# Patient Record
Sex: FEMALE | Race: BLACK OR AFRICAN AMERICAN
[De-identification: names, ages, dates, MRNs, and addresses within clinical notes are randomized per-mention and may not be internally consistent; named-entity substitution may affect disease eponyms.]

---

## 2018-02-22 NOTE — RAD
AP PELVIS:

 

Indication: Fall with pelvic pain. 

 

Comparison: None. 

 

FINDINGS: 

No displaced pelvic fracture is evident. Visualized aspects of the proximal femurs are within normal 
limits. The right proximal femur is incompletely included within the field of view. 

 

IMPRESSION: 

No acute osseous abnormality. 

 

POS: [facility]

## 2018-02-22 NOTE — HP
DATE OF ADMISSION:  02/22/2018

 

REQUESTING PHYSICIAN:  Dr. Rizzo, ER.

 

ADMITTING PHYSICIAN:  Dr. Rodríguez, Trauma.

 

CONSULTING PHYSICIAN:  Dr. Salazar, Orthopedics.

 

HISTORY OF PRESENT ILLNESS:  The patient is a 47-year-old female who was in her usual state of health
 this morning when she reports she was ambulating in her house when her feet got tangled up in some c
ords going to an electric blanket.  She then had a fall there on her living room floor.  She immediat
ely felt pain in her right lower leg.  She was transported to the [facility] via
 EMS.  Workup in the emergency department identified a right open midshaft tib/fib fracture.  She had
 no other identified injuries.  She complained of no other pain.  She reports the pain is constant.  
She does not have any report of loss of sensation in her right lower extremity.  Trauma Services was 
consulted for admission and management.  Dr. Salazar, Orthopedics has been consulted for management
 of fracture.  At the time of evaluation in the ED, she was also being evaluated by PA from Orthopedi
c Surgery.  She reports the pain is constant and exacerbated by movement.  The pain is relieved sligh
tly with administration of IV narcotic analgesia.

 

PAST MEDICAL HISTORY:

1.  Hypertension.

2.  Lupus.

3.  Raynaud's syndrome.

4.  Osteoarthritis.

5.  TIAs in 2016.

6.  Peripheral neuropathy.

 

PAST SURGICAL HISTORY:

1.  Back surgery in 1999.

2.  Left foot bunion surgery.

3.  Bilateral tubal ligation.

 

SOCIAL HISTORY:  The patient denies alcohol, drug or tobacco use.

 

ALLERGIES:  No known drug allergies.

 

CURRENT MEDICATIONS:  Patient reported medications are:

1.  Amlodipine 10 mg daily.

2.  Prednisone 20 mg daily.

3.  Chloroquine, unknown dosage.

4.  Valium, unknown dosage.

5.  Tramadol, unknown dosage.

6.  Gabapentin, unknown dosage.

 

LABORATORY STUDIES:  Hematology:  WBC 6.8, RBC 4.61, hemoglobin 13.7, hematocrit 44.1, platelets 198,
000.  Chemistry:  Sodium 137, potassium 4.7, chloride 102, carbon dioxide 28, BUN 14, creatinine 0.83
, glucose 117.

 

DIAGNOSTIC IMAGING:  Right tibia and fibula x-ray, open moderately displaced tib/fib fracture.

 

EKG interpretation, normal sinus rhythm.

 

REVIEW OF SYSTEMS:  Constitutional:  Negative for fever, chills, weight loss, general malaise.  HEENT
:  No complaints.  Cardiovascular:  The patient reports Raynaud's syndrome.  No chest pain, no palpit
ations, no syncope.  Respiratory:  Denies cough, shortness of breath.  Gastrointestinal:  Denies abdo
berenice pain, constipation, nausea, vomiting or diarrhea.  Musculoskeletal:  Reports fall with right le
g injury.  Reports open right leg fracture.  Skin:  Denies rashes or injury.  Neurologic:  Denies diz
ziness, focal weakness.  Rheumatology:  Reports treatment for systemic lupus.  Hemo/lymphatic:  Negat
brenna.  Psychiatric:  Denies anxiety or depression.

 

PHYSICAL EXAMINATION:

VITAL SIGNS:  Blood pressure 145/96, pulse 85, respirations 18, pain 8/10, O2 sat 96% on room air.

CONSTITUTIONAL:  Well-developed, well-nourished female, lying on bed, in no acute distress.

HEENT:  Atraumatic, normocephalic.  Trachea midline.  No pain to posterior neck.

RESPIRATORY:  Bilateral breath sounds clear to auscultation.  No respiratory distress.

CARDIOVASCULAR:  Regular rate and rhythm.  Heart sounds normal.  EKG with normal sinus rhythm.

ABDOMEN:  Soft, nontender, nondistended.  No masses.  Bowel sounds normal.

EXTREMITIES:  Bilateral upper extremities within normal limits.  Left lower extremity within normal l
imits.  Cap refill brisk.  Pulses 2+.  Right lower extremity, mid shaft open fracture.  2+ pedal puls
es.  Cap refill brisk.  No loss of sensation.

NEUROLOGIC:  GCS 15.  Awake, alert, oriented x3.

PSYCHIATRIC:  Normal mood and affect.

 

ASSESSMENT AND PLAN:

1.  Status post ground level fall.

2.  Right mid shaft open tib/fib fracture.

3.  Acute traumatic pain.

4.  History of lupus.

5.  History of hypertension.

6.  History of Raynaud's syndrome.

 

PLAN:

1.  Admit to surgical floor by Trauma Services.

2.  Consult Orthopedics.  Discussed with Orthopedics PA.  Plan is to take patient to OR later today.

3.  N.p.o.  IV fluids.

4.  IV analgesia for pain control.

5.  PT, OT consult after OR with Orthopedic limitation.

6.  Rehab referral will be made.

7.  Chemical DVT prophylaxis when okay with Orthopedics.

8.  PUD prophylaxis.

9.  Nursing to confirm with outside pharmacy correct current medications.

 

The patient was seen and examined with Dr. Rodríguez, attending trauma surgeon, who agrees with the asses
sment and plan.

## 2018-02-22 NOTE — RAD
FRONTAL AND LATERAL INTRAOPERATIVE IMAGING OF RIGHT TIBIA AND FIBULA

2/22/18

 

HISTORY: 

Fracture status post ORIF.

 

FINDINGS:  

Six intraoperative images are provided. Images demonstrate a nondisplaced fracture involving the mid 
shaft right fibula. A comminuted tibial fracture is present in the mid shaft/distal shaft region, tra
versed by an intramedullary lester with distal and proximal interlocking screws. 

 

IMPRESSION:  

Fractures of the right tibia and fibula status post ORIF as above. 

 

POS: [facility]

## 2018-02-22 NOTE — CON
DATE OF CONSULTATION:  02/22/2018

 

CONSULTING PHYSICIAN:  Dr. Cruzito Salazar

 

We were asked by the ER and Trauma to see the patient.  

 

HISTORY: She was in her normal state of health this morning when she was walking through her house, t
ripped and sustained an open midshaft right tib-fib fracture.  The patient is in significant pain.  S
he is quite anxious.  She recalls the incident, did not hit her head or lose any consciousness.  Curr
ently, she has gotten 2 grams of Ancef at the ER.  We are currently, about ready to splint her.  She 
has good sensations in that right lower extremity.  She does have multiple health issues.  I spoke wi
th Trauma regarding surgical intervention and they are okay with us going this afternoon.  She did ea
t at 7:00 a.m. this morning and she will be our third case this afternoon.

 

PAST MEDICAL HISTORY:  Positive for hypertension, some neurological disease.  She has had a CVA and l
upus, Raynaud's, osteoarthritis, neuropathy, knee pain.

 

PAST SURGICAL HISTORY:  Tubal ligation, back surgery, bilateral bunions and clavicle surgery.

 

PSYCHIATRIC HISTORY:  Depression.

 

FAMILY HISTORY:  Noncontributory.

 

ALLERGIES:  None.

 

CURRENT MEDICATIONS:  She takes Plaquenil, gabapentin, Valium, tramadol, prednisone, amlodipine, aspi
rin.  She is unsure if this is all her medications.  We are trying to track down her medication list 
from her pharmacy.  Trauma is working on that currently.

 

REVIEW OF SYSTEMS:  The patient denies any shortness of breath, chest pain.  No bowel or bladder issu
es.  Her only complaint currently is the significant pain in the right lower extremity.

 

PHYSICAL EXAMINATION:

GENERAL:  Well-nourished, well-developed, mildly obese female resting in bed in mild distress.  Speec
h clear.  Answers questions appropriately.

HEENT:  Normal exam.

NECK:  Supple, trachea midline.

EXTREMITIES:  Upper extremities, full size, shape, symmetry, normal bulk and tone.  Movements are equ
al as are strengths.  Left lower extremity without any abnormal findings.  She does have a noted scar
 over her great toe region from bunion as she does on the left side also.  Right lower extremity obvi
ous opening to mid shin, it has been reduced by EMT transports.  She has good sensations.  DP, PT pul
ses are intact.  She is moving bilateral feet well.

 

ASSESSMENT:  

1.  Multiple health issues.

2.  Open right tibia fracture.

 

PLAN:  I spoke with patient.  We plan on doing the tibial nail today.  She has already gotten 2 grams
 of Ancef.  I went over the risks and benefits of surgery.  The patient was very panicked about talki
ng about cardiac, blood clots, respiratory issues, but she understands the need to go forth with surg
opal and she has given verbal authorization.  We will again go over the risks and benefits of surgery 
prior to surgery, so she can feel a little more comfortable and have anesthesia talked to her.  The l
eg has been splinted by the emergency room.  We will get her set up for surgery and get her posted in
 the OR.  She has currently eaten at 7, but we will keep her n.p.o. and antibiotics on hold to the OR
.

## 2018-02-22 NOTE — RAD
TWO VIEWS OF THE RIGHT FORELEG:

 

Indication: Tripped and fell. 

 

Comparison: None. 

 

FINDINGS: 

There is a spiral fracture involving the mid shaft of the right tibia with posterior displacement of 
the distal fracture fragment one-half shaft width. There is also right patellar rotation of the dista
l fracture fragment in relationship to the proximal tibia approximately 90 degrees. There is also chantelle
e slight medial angulation of the distal fracture fragment at the fracture site. There is associated 
mildly displaced transverse fracture involving the midshaft of the fibula with displacement of the di
stal fracture fragment laterally and posteriorly one-half shaft width. There is soft tissue prominenc
e seen surrounding the fracture site. There is suggestion of some soft tissue gas along the anterior 
and lateral aspect of the right mid foreleg suspicious for an open injury. No additional acute fractu
re is evident. 

 

IMPRESSION: 

Findings suspicious for open, mild to moderately displaced foreleg fracture. 

 

POS: [facility]

## 2018-02-22 NOTE — CT
CT BRAIN WITHOUT CONTRAST:

 

Date:  02/22/18 

 

HISTORY:  

Level II trauma. 

 

FINDINGS:

No evidence of acute infarct, hemorrhage, midline shift, or abnormal extra-axial fluid collections ar
e seen. The ventricular size is appropriate and the basilar cisterns are patent. The bony calvarium i
s intact. The visualized paranasal sinuses and mastoid air cells are well aerated. There is mucosal t
hickening and polyp/mucus retention cyst at the floor of the left maxillary sinus. 

 

IMPRESSION: 

No CT evidence of acute intracranial process.  

 

 

Findings called over the telephone to ER physician, Dr. Tritson Rizzo at 0956 hours. 

 

CODE CR. 

 

POS: [facility]

## 2018-02-22 NOTE — CT
CT CERVICAL SPINE WITH CORONAL AND SAGITALL REFORMATIONS:

 

History: Level II trauma. Neck pain. 

 

FINDINGS: 

There are post op changes of anterior spinal fusion with plate and screws at C6-7 levels in good posi
tion and alignment. No acute fracture or subluxation identified. 

 

Findings were discussed over the telephone with ER physician, Dr. Triston Franco, at 9:59 a.m. 

 

POS: [facility]

## 2018-02-22 NOTE — OP
DATE OF OPERATION:  02/22/2018

 

PROCEDURE PERFORMED:

1.  Right open tibia fracture, irrigation and debridement.

2.  Right tibia intramedullary nail.

 

PREOPERATIVE DIAGNOSIS:  Right grade 2 open mid shaft tibia and fibula fracture.

 

POSTOPERATIVE DIAGNOSIS:  Right grade 2 open mid shaft tibia and fibula fracture.

 

COMPLICATIONS:  None.

 

ESTIMATED BLOOD LOSS:  Minimal.

 

SURGEON:  Priyank Salazar M.D.

 

FIRST ASSISTANT:  lBas Lowe PA-C.

 

IMPLANTS:  Synthes tibial intramedullary nail size 8 x 345 mm.

 

INDICATIONS:  Ms. Keane is a 47-year-old female who fell today.  She sustained an open fracture of he
r tibia and fibula of the right leg.  She was indicated for irrigation and debridement with intramedu
llary nail fixation of the tibia to restore anatomic alignment and promote healing.  Risks have been 
reviewed in detail.  She has elected to proceed with the operation.  Risks to include infection, nerv
e or vascular injury, nonunion, malunion, hardware failure and others.

 

DESCRIPTION OF PROCEDURE:  Ms. Keane was identified in the preoperative holding area.  Her correct ex
tremity was marked.  She was carried to the operating room.  She was positioned supine.  General anes
thesia was induced.  A multidisciplinary timeout was performed.  The right lower extremity was preppe
d and draped in sterile fashion.  She was given intravenous antibiotics.

 

At this point, we began with irrigation and debridement of open wound.  She had a 4 cm V-shaped lacer
ation over the anterior tibia.  We dissected down through the subcutaneous tissues to the bony level.
  We exposed the bony edges.  These were debrided.  We then thoroughly irrigated the wound and bony e
dges with copious lavage.  Once we had a clean bed, we  trimmed the skin edges back to healthy tissue
.  We then applied a reduction clamp across the fracture.

 

At this point, we began preparation for intramedullary nail.  An incision was made over the knee.  We
 dissected down through the subcutaneous tissues to the patellar tendon.  A small patellar split was 
made.  We then inserted a guidewire proximally in the tibia.  We have reamed the guidewire.  We then 
placed the ball-tip guidewire from proximal to distal.  At this point, we overreamed the guidewire up
 to a size 9.5 mm reamer.  Next, we measured for a 345 mm nail.  We then placed our tibial intramedul
vale nail from proximal to distal.  Two cross lock screws were placed proximally and two cross lock s
crews were placed distally.  This completed the operation.  We took final x-ray images.  We then thor
oughly irrigated and closed with 0 Vicryl suture, 2-0 Vicryl suture and nylon for the skin.  A steril
e dressing was applied.  The patient was taken to the recovery room.

## 2018-02-23 NOTE — PRG
DATE OF SERVICE:  02/23/2018

 

ATTENDING PHYSICIAN:  Dr. Ronald Rodríguez.

 

SUBJECTIVE:  The patient is a 47-year-old female who suffered a ground level fall yesterday resulting
 in a right open tib/fib fracture.  She was taken to the OR yesterday for open reduction internal fix
ation as well as irrigation and debridement.  She is currently stable on the floor this morning.  Ini
daniel reporting inadequate pain control, but now reporting improved pain control after adjusting her
 medications.

 

OBJECTIVE:

VITAL SIGNS:  Blood pressure 132/86, pulse 92, temperature 98.2, respirations 20, O2 saturation 97% o
n room air.

GENERAL:  Morbidly obese adult female, lying in bed, in no acute distress.

HEENT:  Normocephalic and atraumatic.

RESPIRATORY:  Her breath sounds are clear to auscultation bilaterally with normal effort.

CARDIOVASCULAR:  She has regular rate and rhythm.  Normal S1 and S2.

ABDOMEN:  Her abdomen is soft, obese, and nontender.  Bowel sounds are normal.

EXTREMITIES:  She is neurovascularly intact x4.  Distal pulses 2+ bilaterally.

NEUROLOGIC:  Her GCS is 15 this morning.  She is alert and oriented x3.

 

LABORATORY DATA:  Hematology:  WBC 7.3, hemoglobin 11.3, hematocrit 35.0, platelets 253.  Chemistry: 
 Sodium 137, potassium 4.7, chloride 102, bicarbonate 28, BUN 14, creatinine 0.83, glucose 117.

 

RADIOGRAPHIC FINDINGS:  There are no radiographs to review today.

 

ASSESSMENT:

1.  Status post ground level fall.

2.  Right midshaft open tib/fib fracture, status post open reduction internal fixation.

3.  Acute traumatic pain.

4.  History of lupus, present on admission.

5.  History of hypertension, present on admission.

6.  History of Raynaud's syndrome, present on admission.

 

PLAN:

1.  Continue to try to optimize her pain control and give other supportive care measures as needed.

2.  The patient initially reports being very reluctant to go to rehabilitation.  However, she is now 
more open to this after having a long discussion with her.  She has not been evaluated at rehab and h
as agreed on the location.  A referral letter has been placed.

 

This patient was seen and examined along with Dr. Ronald Rodríguez on rounds this morning, who agrees wi
th this assessment and plan.

## 2018-02-24 NOTE — PRG
DATE OF SERVICE:  02/24/2018

 

ATTENDING PHYSICIAN:  Ronald Rodríguez D.O.

 

SUBJECTIVE:  The patient is a 47-year-old female who had a ground level fall 2 
days ago resulting in a right open tib/fib fracture.  She is postoperative day #
2 status post ORIF and I&D of the right tibia.  She is currently stable on the 
surgical floor.  Pain has been moderately controlled; however, she does report 
having excessive pain intermittently throughout the day.

 

OBJECTIVE:

VITAL SIGNS:  Temperature 98.9, pulse 111, respirations 16, O2 sat 98% on room 
air, blood pressure 134/90.

CONSTITUTIONAL:  Well-developed and well-nourished female lying in bed in no 
acute distress.

PULMONARY:  Bilateral breath sounds clear to auscultation.  No respiratory 
distress.

CARDIOVASCULAR:  Regular rate and rhythm.  Heart sounds normal.

ABDOMEN:  Soft, nontender, and nondistended.

EXTREMITIES:  Splint to right lower extremity.  Otherwise, extremities, 
atraumatic.  Neurovascularly intact all extremities.  Cap refill brisk.

NEUROLOGIC:  GCS 15.  Awake, alert, oriented x3.

 

ASSESSMENT:

1.  Status post ground level fall.

2.  Right midshaft open tib/fib fracture.

3.  Status post open reduction and internal fixation of right open tib/fib 
fracture.

4.  Acute traumatic pain.

5.  History of lupus, present on admission.

6.  History of hypertension, present on admission.

7.  History of Raynaud's present on admission.

 

PLAN:

1.  Adjust hydrocodone for scheduled hydrocodone q.4 hours rather than q.6 
hours.

2.  Continue to mobilize with physical and occupational therapy.

3.  Case management following for discharge planning.  Anticipate patient will 
be discharged to rehab.

4.  Antibiotics per Orthopedic Service.

5.  Lovenox for DVT prophylaxis.

6.  Pepcid for PUD prophylaxis.

 

The patient was seen and examined with Dr. Rodríguez who agrees with the assessment 
and plan.

 

[facility]

## 2018-02-24 NOTE — PRG
DATE OF SERVICE:  02/24/2018

 

SUBJECTIVE:  The patient is a 48-year-old female with a medical history, postop day #2 from ORIF of r
ight lower leg.

 

The patient does report some sharp pain in that lower leg otherwise.  Prior to that, she said she was
 doing fairly well.  Not much of an appetite at this time.

 

OBJECTIVE:

VITAL SIGNS:  Reviewed.  It has been noted that she does have some increased tachycardia in the night
.  Upon my assessment, heart rate was about 110, blood pressure was normotensive, and saturation was 
97% on room air, and respiratory rate was nonlabored and 18.

 

Physical exam is unchanged otherwise as stated in daily progress note.

 

ASSESSMENT AND PLAN:  Continue care as detailed in the daily progress note.  Continue to monitor.  We
 will reassess in the a.m.  The patient is on Lovenox.

 

Discharge disposition is pending.

## 2018-02-25 NOTE — PRG
DATE OF SERVICE:  02/25/2018

 

ATTENDING PHYSICIAN:  Ronald Rodríguez D.O.

 

SUBJECTIVE:  The patient is a 47-year-old female who had a ground level fall 3 days ago resulting in 
a right open tib-fib fracture.  She is postoperative day #3 status post ORIF and I&D of the right tib
ia.  She is currently stable on the surgical floor.  Pain medication was adjusted yesterday.  Hydroco
done was increased to q.4 hours rather than q.6 hours.  This seems to have improved patient's pain.  
We will continue to check her during activity today to ensure that pain is well controlled.

 

OBJECTIVE:

VITAL SIGNS:  Temperature 97.5, pulse 82, respirations 18, O2 sat 97% room air, blood pressure 103/71
.

CONSTITUTIONAL:  Well-developed, well-nourished female lying in bed in no acute distress.

PULMONARY:  Bilateral breath sounds clear to auscultation.  No respiratory distress.

CARDIOVASCULAR:  Regular rate and rhythm.  Heart sounds normal.

ABDOMEN:  Soft, nontender, and nondistended.

EXTREMITIES:  Splint to right lower extremity.  Otherwise, extremities are atraumatic.  Neurovascular
ly intact all extremities.  Cap refill brisk.

NEUROLOGIC:  GCS of 15.  Awake, alert, and oriented x3.

 

ASSESSMENT:

1.  Status post ground level fall.

2.  Right midshaft open tib-fib fracture.

3.  Status post open reduction and internal fixation and incision and drainage of right open tib-fib 
fracture.

4.  Acute traumatic pain.

5.  History of lupus, present on admission.

6.  History of hypertension, present on admission.

7.  History of Raynaud's, present on admission.

 

PLAN:

1.  Hydrocodone adjusted yesterday to q.4 hours rather than q.6 hours.  We will continue to monitor a
nd make adjustments as necessary.

2.  Continue to mobilize with physical and occupational therapy.

3.  Case management following for discharge planning.  Anticipate patient will be discharged to rehab
 in the next 1-2 days.

4.  Lovenox for DVT prophylaxis.

6.  Pepcid for PUD prophylaxis.

 

The patient was seen and examined with Dr. Rodríguez who agrees with the assessment and plan.

## 2018-02-26 NOTE — PRG
DATE OF SERVICE:  02/25/2018

 

SUBJECTIVE:  This is a 48-year-old female.  She is pending discharge.  At this time, she has had mukul
l movement, but not voided since removal of Soares.  We are awaiting   _____ to clear for discharge
 to [facility] rehab.

 

OBJECTIVE:

VITAL SIGNS:  Reviewed, vital signs otherwise have been stable.  Physical exam unchanged.

 

ASSESSMENT AND PLAN:  We will continue to await voiding trail, discharge pending rehab.  We will reev
aluate in a.m.

## 2018-02-26 NOTE — DIS
REASON FOR HOSPITALIZATION:  Ground level fall with right lower extremity 
fracture.

 

HOSPITAL DIAGNOSIS:  Right grade 2 open midshaft tibia and fibula fracture.

 

PROCEDURES:

1.  Right open tibia fracture, irrigation and debridement. 2.  Right tibia IM 
nail. DATE OF PROCEDURE:  02/22/2018. SURGEON:  Dr. Priyank Salazar.

 

DISCHARGE CONDITION:  Good.

 

DISPOSITION:  [facility].

 

DISCHARGE MEDICATIONS:  Patient may resume all home medications.  She will 
continue analgesia and DVT prophylaxis as ordered in the hospital.

 

ACTIVITY ORDERS:  Weightbearing as tolerated.  Right lower extremity is toe-
touch weightbearing only.

 

THERAPY ORDERS:  Physical and occupational therapy.

 

DIET:  Regular.

 

FOLLOWUP:  Follow up with Dr. Salazar in 2 weeks.

 

BRIEF HISTORY OF HOSPITALIZATION:  Ms. Keane is a 48-year-old female, who was 
in her usual state of health when she was in her home and tripped on a cord 
falling into her floor.  She immediately had pain in her right lower extremity.
  She noted an open fracture and summoned EMS.  She was transported to [place] emergency department, where open tib/fib fracture was identified.  She 
was admitted to the hospital by Trauma services.  Dr. Priyank Salazar, 
orthopedic, was consulted and took the patient to the OR for fixation of 
fracture.  Postoperatively, she was managed on the surgical floor.  She had no 
postoperative complications.  She was able to be transitioned from IV to oral 
only analgesia.  She was evaluated by [facility].  Soares was 
discontinued on postoperative day #2.  She was discharged to [facility] 
once acceptance was gained.  She is to follow up with Dr. Salazar in 2 weeks.
  She may follow up with Trauma services as needed.  She may resume all home 
medications.

 

The patient was seen and examined with Dr. Rodríguez, who agrees with the 
assessment and plan.

 

BAUDILIO

## 2018-02-27 NOTE — DIS
DATE OF ADMISSION:  02/22/2018

 

DATE OF DISCHARGE:  02/26/2018

 

HOSPITAL COURSE:  The patient was initially intended to discharge late yesterday evening.  Her Soares 
catheter was discontinued, yet the patient was unable to void.  Her discharge was held overnight.  Sh
hernando was still unable to void and Soares catheter was replaced this a.m.  This has been conveyed to the r
ab liaison who reports that patient may transfer to their facility with a Soares catheter.  The cynthia
ent will be discharged today to rehabilitation.

 

The patient has remained stable overnight.  There have been no changes in her condition other than as
 stated above.  Pain is well controlled.  She will be discharged to rehab and follow up with Dr. Zachary lovell.  For all other details of hospitalization, please refer to discharge summary that was dictated
 on 02/25/2018.

 

The patient was seen and examined on morning rounds with Dr. Rodríguez who agrees with the assessment and
 plan for discharge.

## 2018-06-25 NOTE — OP
DATE OPERATION:  06/25/2018

 

PROCEDURE:  Open reduction of patellar fracture with excision of inferior pole and advancement of pat
ellar tendon.

 

PREOPERATIVE DIAGNOSIS:  Nonunion of the patellar fracture.

 

POSTOPERATIVE DIAGNOSIS:  Nonunion of the patellar fracture.

 

COMPLICATIONS:  None.

 

ESTIMATED BLOOD LOSS:  Minimal.

 

SURGEON:  Priyank Salazar M.D.

 

ANESTHESIA:  General plus regional.

 

IMPLANTS:  #5 Ethibond suture.

 

INDICATIONS:  Ms. Keane is a 48-year-old female who has fallen several times.  She fractured her mccarthy
lla.  This was gone on to nonunion and she has had further displacement after a second fall.  She is 
not doing well with conservative treatment.  She has elected at this point to undergo surgical interv
ention.  Goal of surgery is to restore function of the knee and promote healing.  She is aware of ris
ks and benefits and wants to proceed.  Surgical options are open reduction and fixation of the patell
ar fragments versus excision of the inferior fragments and patellar tendon advancement.

 

DESCRIPTION OF PROCEDURE:  Ms. Keane was identified in the preoperative holding area.  Her correct ex
tremity was marked.  She was carried to the operating room.  She was positioned supine.  General anes
thesia was induced.  A multidisciplinary timeout was performed.  The left lower extremity was prepped
 and draped in sterile fashion.

 

At this point, we proceeded with anterior approach to the knee.  We dissected down through the subcut
aneous tissues to the patella.  There was obvious palpable motion at the patellar fracture.  We incis
ed the patellar retinaculum over the patella.  We then exposed the underlying fracture.  This was mob
ilized with an osteotome.  At this point, we examined the inferior pole fragment carefully.  This was
 a small fragment and somewhat sclerotic in appearance.  This did not seem to have a good bone healin
g potential.  We had elected to excise this at this point.  This was excised leaving behind the jefferson
lar tendon tissue.  Next, at this point, we drilled multiple holes in the superior pole of the patell
a getting a bleeding bony surface.  We then drilled 3 holes through the patella and passed #3 Ethibon
d sutures through these drill holes.  These were passed through the patellar tendon in a Maurepas fashi
on in a locking stitch.  At this point, the patellar tendon was reduced up to the bone using these Et
hibond sutures.  These were then tied over the patella superiorly.  This restored the relationship of
 the patellar tendon to the patella.  We then repaired the retinaculum with a #2 Vicryl suture follow
ed by 2-0 Vicryl suture and skin closure.  At this point, we applied a sterile dressing and a splint.
  The patient was taken to the recovery room in good condition without complication.

## 2018-07-25 NOTE — RAD
LEFT KNEE 4 VIEWS:

 

Date:  07/25/18 

 

HISTORY:  

47-year-old female with history of trauma, fall, with left knee pain. Had surgery 3 weeks ago by Dr. Salazar. 

 

FINDINGS:

There appears to have been resection of the lower portion of the patella. The patella now lies in a v
opal abnormal superior position, certainly concerning for the possibility of patellar tendon rupture. 
There is marked soft tissue swelling over the prepatellar and superficial infrapatellar region. Visua
lized femur, tibia, and fibula appear intact. 

 

IMPRESSION: 

Evidence for resection of the lower portion of the patella with a very abnormal high-riding patella w
ith marked anterior prepatellar and superficial infrapatellar swelling and fat stranding, evidence fo
r patellar tendon rupture. No prior radiographs available for comparison. 

 

 

POS: CHETAN

## 2018-07-25 NOTE — HP
DATE OF ADMISSION:  07/25/2018

 

CHIEF COMPLAINT:  Left knee pain.

 

HISTORY OF PRESENT ILLNESS:  Ms. Keane is a 48-year-old female who has had multiple falls approximate
ly 10 over the last several months.  She fell last week and had rib fractures.  Of note, I have treat
ed her in the past for a patellar fracture.  She was initially treated nonoperatively; however, after
 for subsequent falls.  She had a displaced patellar fracture with comminution.  She eventually went 
to surgery for an inferior pole of the patella excision and patellar tendon advancement.  This was ap
proximately 1 month ago.  Today when she fell she felt a tearing or popping sensation at the knee and
 was unable to bear weight.

 

Repeat x-rays today have shown high riding patella, which is consistent with possible patellar tendon
 rupture.  Her wound has remained intact.  She has not been wearing her knee immobilizer as ivy low.  She is living independently and has no assistance at home.  She is currently having pain in the k
nee, she denies other pain.

 

PAST MEDICAL HISTORY:  Consistent of cerebrovascular accident in 2016, hypertension, lupus, Raynaud s
yndrome, osteoarthritis, neuropathy on chronic steroids.

 

PAST SURGICAL HISTORY:  Tubal ligation, previous lumbar back surgery, previous clavicle, ORIF, previo
us left knee patellar surgery with patellar tendon advancement, previous bunionectomy.

 

PSYCHIATRIC HISTORY:  Includes depression.

 

SOCIAL HISTORY:  The patient denies alcohol, tobacco, or drug use.  She lives alone.

 

FAMILY MEDICAL HISTORY:  Noncontributory.

 

ALLERGIES:  No known drug allergies.

 

REVIEW OF SYSTEMS:  Positive for left knee pain as per HPI.

 

PHYSICAL EXAMINATION:

VITAL SIGNS:  Blood pressure is 104/79, pulse is 77, respiratory 15, temperature is 97.6, 95% on room
 air.

GENERAL:  She is sitting with head off bed elevated.  Alert, talkative, no apparent distress.

HEENT:  Normocephalic, atraumatic.

RESPIRATORY:  Breathing comfortably.

ABDOMEN:  Soft, nontender, nondistended.

MUSCULOSKELETAL:  The patient's left leg has a healed surgical wound anteriorly.  She has an effusion
 and swelling of the knee.  She is unable to do a straight leg raise.  She is able to flex and extend
 the foot and ankle.  No warmth.

 

IMAGES:  Left knee x-rays demonstrate superior pole of the patella, which is high riding in relations
hip to the tibial tubercle.  This is consistent with a patellar tendon rupture.

 

IMPRESSION:  Left patellar tendon rupture with history of multiple falls and previous patellar fractu
re.

 

PLAN:  At this point, I will need to bring the patient into the hospital.  She will be admitted for p
ain control and preparation for surgery.  I will plan for patella tendon repair to be done tomorrow. 
 I will need to reinforce this significantly given her noncompliance with bracing and the fact that s
he has so prone to falling.  I will put her in a cylinder cast.  I hopefully will be able to arrange 
for her to go to a skilled nursing facility until she is healed.  She is aware of this plan and wants
 to proceed.  She is aware of risks.  Risks to include infection, further complication of healing and
 others.

## 2018-07-26 NOTE — OP
DATE OF OPERATION:  07/26/2018

 

PROCEDURE PERFORMED:

1.  Open repair of left patellar tendon.

2.  Long-leg cast placement.

 

PREOPERATIVE DIAGNOSIS:  Rupture of left patellar tendon.

 

POSTOPERATIVE DIAGNOSIS:  Rupture of left patellar tendon.

 

COMPLICATIONS:  None.

 

ESTIMATED BLOOD LOSS:  Minimal.

 

SURGEON:  Priyank Salazar MD

 

FIRST ASSISTANT:  Cathi Kim PA-C

 

IMPLANTS:  Ethibond suture and mersilene tape was used.

 

INDICATIONS:  Ms. Keane is a 48-year-old female who has fallen multiple times in the last several mon
ths.  She sustained a patellar fracture that eventually required surgery.  She has fallen again and r
e-ruptured her patellar tendon.  She has been indicated for patellar tendon repair and cast placement
 to hopefully promote healing at this point.  Risks are extensive and have been reviewed in detail.  
She is aware of surgical risk.

 

DESCRIPTION OF PROCEDURE:  Ms. Keane was identified in the preoperative holding area.  Her correct ex
tremity was marked.  She was carried to the operating room.  She was positioned supine.  General anes
thesia was induced.  A multidisciplinary timeout was performed.  The left lower extremity was prepped
 and draped in sterile fashion.

 

We began the procedure with anterior incision over the knee.  We dissected down through the subcutane
ous tissues to the fascia level.  We encountered the ruptured patellar tendon at the inferior pole of
 the patella.  There was significant hematoma and torn tissue.  This was debrided sharply.  We remove
d the hematoma.  We exposed the medial and lateral patellar retinaculum.  At this point, we used #5 E
thibond suture to place [place] sutures up and down the patellar tendon.  Four sutures were placed.  T
hese allowed strong fixation and hold onto the patellar tendon.  At this point, these were brought th
rough the patella through drill holes using a Armor5 suture passer.  All sutures were pulled up thro
ugh the patella.  The patella was reduced back into its anatomic position with the patellar tendon.  
This relationship was maintained by tying the sutures into position.

 

At this point, we passed a mersilene tape.  A drill hole was made in the tibial tubercle.  We then pa
ssed our tape through the drill hole.  This was taken above the patella in the suprapatellar tissues.
  The knee was flexed to 45 degrees and the mersilene tape was tied into this isometric-type position
.  At this point, again we thoroughly irrigated with copious lavage.  We then closed the retinaculum 
with #2 Vicryl suture followed by 0 Vicryl, 2-0 Vicryl, and nylon for the skin.

 

At this point, a sterile dressing was placed.  We then placed the patient in a cylinder cast, which w
as well padded and made of fiberglass rolls.  She was then taken to the recovery room in good conditi
on.  There were no complications.

## 2018-07-28 NOTE — CON
DATE OF SERVICE:  07/28/2018

 

SUBJECTIVE:  The patient is doing well.  Pain is controlled.  She has been up mobilizing with physica
l therapy.  She is awaiting on a rehabilitation bed to open.  She is having some discomfort around he
r heel related to her cast.  This is only present when she is upright, but it is causing her discomfo
rt and limiting her weightbearing.

 

PHYSICAL EXAMINATION:

VITAL SIGNS:  Stable.  She is afebrile, normotensive.

GENERAL:  Alert and oriented, in no apparent distress.

RESPIRATORY:  Breathing comfortably.

ABDOMEN:  Soft, nontender, nondistended.

MUSCULOSKELETAL:  The left leg is in a cylinder cast.  Her foot is warm and well perfused.  She does 
have some pressure over the posterior aspect of her heel related to her cast.  There is no wound or s
ignificant skin irritation.

NEUROLOGICAL:  She is neurovascularly intact.

 

IMPRESSION:  Status post patellar tendon repair with cylinder cast placement.

 

PLAN:  The patient is waiting on a Skilled Nursing Facility bed to open in [place].  At that point, s
he can be discharged.  She can weightbear as tolerated, but no knee flexion.  Continue cast.  We will
 trim the distal aspect of the cast to make her more comfortable.  She asked about her lupus medicati
ons.  Her medications are ordered, but nonformulary.  We will check on this.

## 2022-09-20 ENCOUNTER — HOSPITAL ENCOUNTER (OUTPATIENT)
Dept: HOSPITAL 92 - TBSIIMAG | Age: 52
Discharge: HOME | End: 2022-09-20
Attending: NEUROLOGICAL SURGERY
Payer: MEDICARE

## 2022-09-20 DIAGNOSIS — S22.039A: ICD-10-CM

## 2022-09-20 DIAGNOSIS — S22.089A: Primary | ICD-10-CM

## 2022-09-20 PROCEDURE — 72070 X-RAY EXAM THORAC SPINE 2VWS: CPT
